# Patient Record
Sex: FEMALE | Race: BLACK OR AFRICAN AMERICAN | NOT HISPANIC OR LATINO | Employment: OTHER | URBAN - METROPOLITAN AREA
[De-identification: names, ages, dates, MRNs, and addresses within clinical notes are randomized per-mention and may not be internally consistent; named-entity substitution may affect disease eponyms.]

---

## 2018-03-20 NOTE — PATIENT DISCUSSION
High risk for progression with cuff subretinal fluid and/or traction. Discussed options with patient and they would like to proceed with LASER TREATMENT TODAY.

## 2018-03-20 NOTE — PROCEDURE NOTE: CLINICAL
PROCEDURE NOTE: Laser for Retinal Tear #1 OD. Diagnosis: Retinal Hole. Anesthesia: Topical. Prior to laser, risks/benefits/alternatives to laser discussed including loss of vision, decreased peripheral and night vision, need for more laser and/or surgery and patient wished to proceed. An informed consent was obtained and no assurances or guarantees were given. Spot size: 200* um. Pulse power: 170* mW. Pulse duration: 100* ms. Number of pulses:97*. Procedure [id number] *. Patient tolerated procedure well. There were no complications. Post-op instructions given. Patient given office phone number/answering service number and advised to call immediately should there be loss of vision or pain, or should they have any other questions or concerns. Radha Canter

## 2022-04-26 ENCOUNTER — NEW PATIENT (OUTPATIENT)
Dept: URBAN - METROPOLITAN AREA CLINIC 16 | Facility: CLINIC | Age: 57
End: 2022-04-26

## 2022-04-26 DIAGNOSIS — H25.13: ICD-10-CM

## 2022-04-26 DIAGNOSIS — H04.123: ICD-10-CM

## 2022-04-26 DIAGNOSIS — H52.223: ICD-10-CM

## 2022-04-26 PROCEDURE — 92015 DETERMINE REFRACTIVE STATE: CPT

## 2022-04-26 PROCEDURE — 92004 COMPRE OPH EXAM NEW PT 1/>: CPT

## 2022-04-26 ASSESSMENT — VISUAL ACUITY
OD_SC: 20/25-2
OS_SC: 20/25
OU_SC: 20/20

## 2022-04-26 ASSESSMENT — KERATOMETRY
OD_K1POWER_DIOPTERS: 44.50
OD_AXISANGLE2_DEGREES: 63
OS_K2POWER_DIOPTERS: 45.25
OS_K1POWER_DIOPTERS: 45.00
OD_AXISANGLE_DEGREES: 153
OS_AXISANGLE2_DEGREES: 78
OD_K2POWER_DIOPTERS: 45.00
OS_AXISANGLE_DEGREES: 168

## 2022-04-26 ASSESSMENT — TONOMETRY
OS_IOP_MMHG: 15
OD_IOP_MMHG: 14

## 2023-07-25 ENCOUNTER — COMPREHENSIVE EXAM (OUTPATIENT)
Dept: URBAN - METROPOLITAN AREA CLINIC 16 | Facility: CLINIC | Age: 58
End: 2023-07-25

## 2023-07-25 DIAGNOSIS — H52.223: ICD-10-CM

## 2023-07-25 DIAGNOSIS — H04.123: ICD-10-CM

## 2023-07-25 PROCEDURE — 92015 DETERMINE REFRACTIVE STATE: CPT

## 2023-07-25 PROCEDURE — 92014 COMPRE OPH EXAM EST PT 1/>: CPT

## 2023-07-25 ASSESSMENT — KERATOMETRY
OS_K1POWER_DIOPTERS: 44.75
OS_AXISANGLE_DEGREES: 165
OD_K1POWER_DIOPTERS: 44.50
OS_AXISANGLE2_DEGREES: 78
OD_K2POWER_DIOPTERS: 45.00
OD_K2POWER_DIOPTERS: 45.25
OS_AXISANGLE_DEGREES: 168
OS_K1POWER_DIOPTERS: 45.00
OD_AXISANGLE_DEGREES: 167
OD_AXISANGLE2_DEGREES: 77
OS_K2POWER_DIOPTERS: 45.50
OS_K2POWER_DIOPTERS: 45.25
OS_AXISANGLE2_DEGREES: 75
OD_AXISANGLE_DEGREES: 153
OD_AXISANGLE2_DEGREES: 63

## 2023-07-25 ASSESSMENT — VISUAL ACUITY
OS_PH: 20/20
OU_SC: 20/20
OD_CC: 20/20
OD_SC: 20/20
OD_CC: 20/20
OU_CC: 20/20
OS_CC: 20/20
OS_CC: 20/20
OU_CC: 20/20
OS_SC: 20/40

## 2023-07-25 ASSESSMENT — TONOMETRY
OS_IOP_MMHG: 15
OD_IOP_MMHG: 16

## 2024-08-05 ENCOUNTER — COMPREHENSIVE EXAM (OUTPATIENT)
Dept: URBAN - METROPOLITAN AREA CLINIC 16 | Facility: CLINIC | Age: 59
End: 2024-08-05

## 2024-08-05 DIAGNOSIS — H43.813: ICD-10-CM

## 2024-08-05 DIAGNOSIS — H52.223: ICD-10-CM

## 2024-08-05 DIAGNOSIS — H25.13: ICD-10-CM

## 2024-08-05 DIAGNOSIS — H04.123: ICD-10-CM

## 2024-08-05 PROCEDURE — 92014 COMPRE OPH EXAM EST PT 1/>: CPT

## 2024-08-05 PROCEDURE — 92015 DETERMINE REFRACTIVE STATE: CPT

## 2024-08-05 ASSESSMENT — KERATOMETRY
OD_K2POWER_DIOPTERS: 45.00
OS_AXISANGLE2_DEGREES: 78
OS_AXISANGLE_DEGREES: 168
OD_K1POWER_DIOPTERS: 44.50
OS_K2POWER_DIOPTERS: 45.50
OS_AXISANGLE2_DEGREES: 75
OS_K2POWER_DIOPTERS: 45.25
OS_K1POWER_DIOPTERS: 45.00
OD_AXISANGLE2_DEGREES: 63
OD_AXISANGLE_DEGREES: 167
OD_AXISANGLE_DEGREES: 153
OD_AXISANGLE2_DEGREES: 77
OS_K1POWER_DIOPTERS: 44.75
OS_AXISANGLE_DEGREES: 165
OD_K2POWER_DIOPTERS: 45.25

## 2024-08-05 ASSESSMENT — VISUAL ACUITY
OD_SC: 20/30
OD_CC: J3
OS_SC: 20/25
OS_CC: J3

## 2024-08-05 ASSESSMENT — TONOMETRY
OD_IOP_MMHG: 20
OS_IOP_MMHG: 21